# Patient Record
Sex: FEMALE | Race: WHITE | Employment: UNEMPLOYED | URBAN - NONMETROPOLITAN AREA
[De-identification: names, ages, dates, MRNs, and addresses within clinical notes are randomized per-mention and may not be internally consistent; named-entity substitution may affect disease eponyms.]

---

## 2018-07-03 ENCOUNTER — APPOINTMENT (OUTPATIENT)
Dept: ULTRASOUND IMAGING | Facility: OTHER | Age: 33
End: 2018-07-03
Attending: PHYSICIAN ASSISTANT
Payer: COMMERCIAL

## 2018-07-03 ENCOUNTER — HOSPITAL ENCOUNTER (EMERGENCY)
Facility: OTHER | Age: 33
Discharge: HOME | End: 2018-07-03
Attending: PHYSICIAN ASSISTANT | Admitting: PHYSICIAN ASSISTANT
Payer: COMMERCIAL

## 2018-07-03 VITALS
HEART RATE: 65 BPM | DIASTOLIC BLOOD PRESSURE: 50 MMHG | RESPIRATION RATE: 18 BRPM | BODY MASS INDEX: 23.9 KG/M2 | TEMPERATURE: 97.6 F | HEIGHT: 64 IN | OXYGEN SATURATION: 97 % | WEIGHT: 140 LBS | SYSTOLIC BLOOD PRESSURE: 103 MMHG
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
## 2018-07-03 DIAGNOSIS — S80.211A KNEE ABRASION, RIGHT, INITIAL ENCOUNTER: ICD-10-CM

## 2018-07-03 DIAGNOSIS — Z3A.23 23 WEEKS GESTATION OF PREGNANCY: ICD-10-CM

## 2018-07-03 LAB
ALBUMIN UR-MCNC: NEGATIVE MG/DL
APPEARANCE UR: CLEAR
BACTERIA #/AREA URNS HPF: ABNORMAL /HPF
BILIRUB UR QL STRIP: NEGATIVE
COLOR UR AUTO: YELLOW
GLUCOSE UR STRIP-MCNC: NEGATIVE MG/DL
HGB UR QL STRIP: NEGATIVE
KETONES UR STRIP-MCNC: NEGATIVE MG/DL
LEUKOCYTE ESTERASE UR QL STRIP: ABNORMAL
NITRATE UR QL: NEGATIVE
NON-SQ EPI CELLS #/AREA URNS LPF: ABNORMAL /LPF
PH UR STRIP: 6 PH (ref 5–7)
RBC #/AREA URNS AUTO: ABNORMAL /HPF
SOURCE: ABNORMAL
SP GR UR STRIP: <1.005 (ref 1–1.03)
UROBILINOGEN UR STRIP-ACNC: 0.2 EU/DL (ref 0.2–1)
WBC #/AREA URNS AUTO: ABNORMAL /HPF

## 2018-07-03 PROCEDURE — 76805 OB US >/= 14 WKS SNGL FETUS: CPT

## 2018-07-03 PROCEDURE — 99284 EMERGENCY DEPT VISIT MOD MDM: CPT | Mod: 25 | Performed by: PHYSICIAN ASSISTANT

## 2018-07-03 PROCEDURE — 76815 OB US LIMITED FETUS(S): CPT

## 2018-07-03 PROCEDURE — 81001 URINALYSIS AUTO W/SCOPE: CPT | Performed by: PHYSICIAN ASSISTANT

## 2018-07-03 PROCEDURE — 99283 EMERGENCY DEPT VISIT LOW MDM: CPT | Mod: Z6 | Performed by: PHYSICIAN ASSISTANT

## 2018-07-03 PROCEDURE — 99284 EMERGENCY DEPT VISIT MOD MDM: CPT | Performed by: PHYSICIAN ASSISTANT

## 2018-07-03 RX ORDER — PRENATAL VIT/IRON FUM/FOLIC AC 27MG-0.8MG
1 TABLET ORAL DAILY
COMMUNITY

## 2018-07-03 ASSESSMENT — ENCOUNTER SYMPTOMS
ARTHRALGIAS: 0
HEADACHES: 0
ABDOMINAL PAIN: 0
COLOR CHANGE: 0
FEVER: 0
DIFFICULTY URINATING: 0
SHORTNESS OF BREATH: 0
NECK STIFFNESS: 0
EYE REDNESS: 0
CONFUSION: 0
NAUSEA: 0
BLOOD IN STOOL: 0
VOMITING: 0
APPETITE CHANGE: 0

## 2018-07-03 NOTE — ED AVS SNAPSHOT
[facility]    [address]    [place] [address]    Phone:  [phone]    Fax:  [phone]                                       Saba Kaba   MRN: [id number]    Department:  [facility]   Date of Visit:  7/3/2018           Patient Information     Date Of Birth          1985        Your diagnoses for this visit were:     Fall, initial encounter     Knee abrasion, right, initial encounter     23 weeks gestation of pregnancy        You were seen by Austin Segal PA-C.      24 Hour Appointment Hotline     To schedule an appointment at [facility], please call [phone]. If you don't have a family doctor or clinic, we will help you find one. [facility] clinics are conveniently located to serve the needs of you and your family.           Review of your medicines      Our records show that you are taking the medicines listed below. If these are incorrect, please call your family doctor or clinic.        Dose / Directions Last dose taken    MAGNESIUM OXIDE PO        Refills:  0        prenatal multivitamin plus iron 27-0.8 MG Tabs per tablet   Dose:  1 tablet        Take 1 tablet by mouth daily   Refills:  0                Procedures and tests performed during your visit     *UA reflex to Microscopic    US OB > 14 Weeks Complete Single    Urine Microscopic      Orders Needing Specimen Collection     None      Pending Results     No orders found from 7/1/2018 to 7/4/2018.            Pending Culture Results     No orders found from 7/1/2018 to 7/4/2018.            Pending Results Instructions     If you had any lab results that were not finalized at the time of your Discharge, you can call the ED Lab Result RN at [phone]. You will be contacted by this team for any positive Lab results or changes in treatment. The nurses are available 7 days a week from 10A to 6:30P.  You can leave a message 24 hours per day and they will return your call.    "     Thank you for choosing [facility]       Thank you for choosing [facility] for your care. Our goal is always to provide you with excellent care. Hearing back from our patients is one way we can continue to improve our services. Please take a few minutes to complete the written survey that you may receive in the mail after you visit with us. Thank you!        [email or web address] Information     [employer] lets you send messages to your doctor, view your test results, renew your prescriptions, schedule appointments and more. To sign up, go to [email or web address] . Click on \"Log in\" on the left side of the screen, which will take you to the Welcome page. Then click on \"Sign up Now\" on the right side of the page.     You will be asked to enter the access code listed below, as well as some personal information. Please follow the directions to create your username and password.     Your access code is: [email or web address]  Expires: 10/1/2018 12:39 PM     Your access code will  in 90 days. If you need help or a new code, please call your [facility] clinic or [phone].        Care EveryWhere ID     This is your Care EveryWhere ID. This could be used by other organizations to access your [facility] medical records  [id number]        Equal Access to Services     RAN HICKMAN : Negar pittmano Jennifer, waaxda luqadaha, qaybta kaalmada adevenkatayada, keegan kelly. So [facility] [phone].    ATENCIÓN: Si habla español, tiene a johnson disposición servicios gratuitos de asistencia lingüística. Llame al [phone].    We comply with applicable federal civil rights laws and [place] laws. We do not discriminate on the basis of race, color, national origin, age, disability, sex, sexual orientation, or gender identity.            After Visit Summary       This is your record. Keep this with you and show to your community pharmacist(s) and doctor(s) at your next visit.                  "

## 2018-07-03 NOTE — ED AVS SNAPSHOT
"    [facility]: [phone]                                              INTERAGENCY TRANSFER FORM - LAB / IMAGING / EKG / EMG RESULTS   7/3/2018                    Hospital Admission Date: 7/3/2018  [employer]   : 1985  Sex: Female        Attending Provider: Austin Segal PA-C     Allergies:  No Known Allergies    Infection:  None   Service:  EMERGENCY ME    Ht:  1.626 m (5' 4\")   Wt:  63.5 kg (140 lb)   Admission Wt:  63.5 kg (140 lb)    BMI:  24.03 kg/m 2   BSA:  1.69 m 2            Patient PCP Information     Provider PCP Type    Provider Not In System General         Lab Results - 3 Days      *UA reflex to Microscopic [[id number]] (Abnormal)  Resulted: 18 1113, Result status: Final result    Ordering provider: Austin Segal PA-C  18 1056 Resulting lab: [facility]    Specimen Information    Type Source Collected On   Midstream Urine Urine clean catch 18 1100          Components       Value Reference Range Flag Lab   Color Urine Yellow   [facility]   Appearance Urine Clear   [facility]   Glucose Urine Negative NEG^Negative mg/dL  [facility]   Bilirubin Urine Negative NEG^Negative  [facility]   Ketones Urine Negative NEG^Negative mg/dL  [facility]   Specific Gravity Urine <1.005 1.003 - 1.035  [facility]   Blood Urine Negative NEG^Negative  [facility]   pH Urine 6.0 5.0 - 7.0 pH  [facility]   Protein Albumin Urine Negative NEG^Negative mg/dL  [facility]   Urobilinogen Urine 0.2 0.2 - 1.0 EU/dL  [facility]   Nitrite Urine Negative NEG^Negative  [facility]   Leukocyte Esterase Urine Trace NEG^Negative A [facility]   Source Midstream Urine   [facility]            Urine Microscopic [[id number]] (Abnormal)  Resulted: 18 1113, Result status: Final result    Ordering provider: Austin Segal PA-C  18 1100 Resulting lab: [facility]    Specimen Information    Type Source Collected On     18 1100 "          Components       Value Reference Range Flag Lab   WBC Urine 0 - 5 OTO5^0 - 5 /HPF  [facility]   RBC Urine O - 2 OTO2^O - 2 /HPF  [facility]   Squamous Epithelial /LPF Urine Few FEW^Few /LPF  [facility]   Bacteria Urine Few NEG^Negative /HPF A [facility]            Testing Performed By     Lab - Abbreviation Name Director Address Valid Date Range    1743 - [facility] Unknown [address]  [facility] [address] 08/07/15 0948 - Present            Unresulted Labs     None         Imaging Results - 3 Days      US OB > 14 Weeks Complete Single [[id number]]  Resulted: 07/03/18 1224, Result status: Final result    Ordering provider: Austin Segal PA-C  07/03/18 1052 Resulted by: Aroldo Noble MD    Performed: 07/03/18 1059 - 07/03/18 1138 Resulting lab: RADIOLOGY RESULTS    Narrative:       OB ULTRASOUND REPORT     Clinical history:  fall, trauma;      Gestation:  1  Presentation: Cephalic  Lie:  Longitudinal    Cardiac Activity:  Regular  BPM:  146   Movement:  Yes    Placenta: Anterior  stGstrstastdstest:st st1st Previa:  Low lying, approximately 1 cm from the cervical os. Note that  the patient declined transvaginal scan.      Cervix:  3.5 cm in length    HINA:  14.5 cm        Impression:       Impression: Low-lying placenta. Recommend continued follow-up. HINA is  14.5 cm. Fetal heart rate is 146 bpm.      AROLDO NOBLE MD      Testing Performed By     Lab - Abbreviation Name Director Address Valid Date Range    104 - [facility] RADIOLOGY RESULTS Unknown Unknown 02/16/05 1553 - Present            Encounter-Level Documents:     There are no encounter-level documents.      Order-Level Documents:     There are no order-level documents.

## 2018-07-03 NOTE — ED PROVIDER NOTES
History     Chief Complaint   Patient presents with     Fall     HPI Comments: This is a 32-year-old female who is currently 23 weeks pregnant with her first child.  She is from [place] but her and her  have a Cabin on [employer].  Yesterday she was walking her dog when he pulled her with the lesion she landed on her right knee sustaining an abrasion to the outer knee.  He has been able to ambulate since then has some minimal bruising to her outer hip as well.  But her main concern today is her baby.  She is here for an ultrasound to make sure everything is fine.  Denies any vaginal drainage.  No fever or chills.  No lightheadedness or dizziness.  Abdominal pain.    The history is provided by the patient.       Problem List:    There are no active problems to display for this patient.       Past Medical History:    History reviewed. No pertinent past medical history.    Past Surgical History:    No past surgical history on file.    Family History:    No family history on file.    Social History:  Marital Status:    Social History   Substance Use Topics     Smoking status: Not on file     Smokeless tobacco: Not on file     Alcohol use Not on file        Medications:      MAGNESIUM OXIDE PO   Prenatal Vit-Fe Fumarate-FA (PRENATAL MULTIVITAMIN PLUS IRON) 27-0.8 MG TABS per tablet         Review of Systems   Constitutional: Negative for appetite change and fever.   HENT: Negative for congestion.    Eyes: Negative for redness.   Respiratory: Negative for shortness of breath.    Cardiovascular: Negative for chest pain.   Gastrointestinal: Negative for abdominal pain, blood in stool, nausea and vomiting.   Genitourinary: Negative for difficulty urinating, vaginal bleeding and vaginal discharge.   Musculoskeletal: Negative for arthralgias and neck stiffness.        Right hip contusion and right knee abrasion   Skin: Negative for color change.   Neurological: Negative for headaches.   Psychiatric/Behavioral: Negative  "for confusion.       Physical Exam   BP: 118/90  Pulse: 70  Temp: 97.6  F (36.4  C)  Resp: 18  Height: 162.6 cm (5' 4\")  Weight: 63.5 kg (140 lb)  SpO2: 97 %      Physical Exam   Constitutional: She is oriented to person, place, and time. No distress.   HENT:   Head: Atraumatic.   Mouth/Throat: Oropharynx is clear and moist. No oropharyngeal exudate.   Eyes: Pupils are equal, round, and reactive to light. No scleral icterus.   Cardiovascular: Normal heart sounds and intact distal pulses.    Pulmonary/Chest: Breath sounds normal. No respiratory distress.   Abdominal: Soft. Bowel sounds are normal. She exhibits mass. She exhibits no distension. There is no tenderness. There is no rebound and no guarding.   23 weeks pregnant.  FHTs 150's.  No rebound .  BS normal.    Musculoskeletal: She exhibits no edema or tenderness.   Neurological: She is alert and oriented to person, place, and time.   Skin: Skin is warm. No rash noted. She is not diaphoretic.       ED Course     ED Course   Value Comment Time   US OB > 14 Weeks Complete Single (Reviewed) 07/03 1133     Procedures              Results for orders placed or performed during the hospital encounter of 07/03/18 (from the past 24 hour(s))   *UA reflex to Microscopic   Result Value Ref Range    Color Urine Yellow     Appearance Urine Clear     Glucose Urine Negative NEG^Negative mg/dL    Bilirubin Urine Negative NEG^Negative    Ketones Urine Negative NEG^Negative mg/dL    Specific Gravity Urine <1.005 1.003 - 1.035    Blood Urine Negative NEG^Negative    pH Urine 6.0 5.0 - 7.0 pH    Protein Albumin Urine Negative NEG^Negative mg/dL    Urobilinogen Urine 0.2 0.2 - 1.0 EU/dL    Nitrite Urine Negative NEG^Negative    Leukocyte Esterase Urine Trace (A) NEG^Negative    Source Midstream Urine    Urine Microscopic   Result Value Ref Range    WBC Urine 0 - 5 OTO5^0 - 5 /HPF    RBC Urine O - 2 OTO2^O - 2 /HPF    Squamous Epithelial /LPF Urine Few FEW^Few /LPF    Bacteria Urine " Few (A) NEG^Negative /HPF   US OB > 14 Weeks Complete Single    Narrative    OB ULTRASOUND REPORT     Clinical history:  fall, trauma;      Gestation:  1  Presentation: Cephalic  Lie:  Longitudinal    Cardiac Activity:  Regular  BPM:  146   Movement:  Yes    Placenta: Anterior  stGstrstastdstest:st st1st Previa:  Low lying, approximately 1 cm from the cervical os. Note that  the patient declined transvaginal scan.      Cervix:  3.5 cm in length    HINA:  14.5 cm        Impression    Impression: Low-lying placenta. Recommend continued follow-up. HINA is  14.5 cm. Fetal heart rate is 146 bpm.      AROLDO NOBLE MD       Medications - No data to display    Assessments & Plan (with Medical Decision Making)     I have reviewed the nursing notes.    I have reviewed the findings, diagnosis, plan and need for follow up with the patient.    New Prescriptions    No medications on file       Final diagnoses:   Fall, initial encounter   Knee abrasion, right, initial encounter   23 weeks gestation of pregnancy     Afebrile.  Vital signs stable.  23 weeks gestation of first pregnancy.  Fall yesterday from her dog dragging her on a leash she does have right knee abrasions and some right hip contusions.  Concern today is her pregnancy denies any discharge or cramping.  Ultrasound of her OB shows low-lying placenta.  Recommended continued follow-up.  HINA is 14.5 cm.  Fetal heart rate is 1 46 bpm.  Discussed these results with Dr. Ama Sims.  She reviewed this with no concerns for baby at this time.  Reassurance given to mother.  Discussed close follow-up with her OB/GYN when she returns back to [place] in 1 week.  Sooner if there are any concerns.  7/3/2018   [facility]     Austin Segal PA-C  07/03/18 5821

## 2018-07-03 NOTE — ED AVS SNAPSHOT
[facility]    [address]    [place] [address]    Phone:  [phone]    Fax:  [phone]                                       Saba Kaba   MRN: [id number]    Department:  [facility]   Date of Visit:  7/3/2018           After Visit Summary Signature Page     I have received my discharge instructions, and my questions have been answered. I have discussed any challenges I see with this plan with the nurse or doctor.    ..........................................................................................................................................  Patient/Patient Representative Signature      ..........................................................................................................................................  Patient Representative Print Name and Relationship to Patient    ..................................................               ................................................  Date                                            Time    ..........................................................................................................................................  Reviewed by Signature/Title    ...................................................              ..............................................  Date                                                            Time